# Patient Record
Sex: MALE | Race: WHITE | URBAN - METROPOLITAN AREA
[De-identification: names, ages, dates, MRNs, and addresses within clinical notes are randomized per-mention and may not be internally consistent; named-entity substitution may affect disease eponyms.]

---

## 2019-04-18 ENCOUNTER — TELEPHONE (OUTPATIENT)
Dept: PULMONOLOGY | Age: 49
End: 2019-04-18

## 2019-04-18 NOTE — TELEPHONE ENCOUNTER
NPT referral from Dr. Sam Pickett for ILD. Per darryl Carroll for N/A 45 min opening. Need a new PFT faxed, copy sent was not legible, very blurry.

## 2019-05-03 NOTE — TELEPHONE ENCOUNTER
Mailed NPT paperwork. Faxed request to Dr. Arroyo Crelul office to send PFT and labs on pt. Faxed request for imaging disc to be mailed.

## 2019-07-22 ENCOUNTER — TELEPHONE (OUTPATIENT)
Dept: PULMONOLOGY | Age: 49
End: 2019-07-22

## 2019-09-23 NOTE — TELEPHONE ENCOUNTER
Office has not rec'd PFT from [facility]. Faxed another request for result to be sent prior to pt appt with Dr. Mercy Mcnulty.

## 2019-09-26 ENCOUNTER — OFFICE VISIT (OUTPATIENT)
Dept: PULMONOLOGY | Age: 49
End: 2019-09-26
Payer: COMMERCIAL

## 2019-09-26 VITALS
SYSTOLIC BLOOD PRESSURE: 134 MMHG | BODY MASS INDEX: 39.17 KG/M2 | TEMPERATURE: 98.5 F | DIASTOLIC BLOOD PRESSURE: 68 MMHG | HEIGHT: 75 IN | RESPIRATION RATE: 16 BRPM | WEIGHT: 315 LBS | OXYGEN SATURATION: 96 % | HEART RATE: 93 BPM

## 2019-09-26 DIAGNOSIS — Z87.891 FORMER TOBACCO USE: ICD-10-CM

## 2019-09-26 DIAGNOSIS — J44.9 COPD, MODERATE (HCC): ICD-10-CM

## 2019-09-26 DIAGNOSIS — I27.20 PULMONARY HYPERTENSION (HCC): ICD-10-CM

## 2019-09-26 DIAGNOSIS — R93.89 ABNORMAL CHEST CT: ICD-10-CM

## 2019-09-26 PROCEDURE — 99205 OFFICE O/P NEW HI 60 MIN: CPT | Performed by: INTERNAL MEDICINE

## 2019-09-26 RX ORDER — SOTALOL HYDROCHLORIDE 80 MG/1
80 TABLET ORAL
COMMUNITY
Start: 2018-02-21

## 2019-09-26 RX ORDER — ERGOCALCIFEROL 1.25 MG/1
CAPSULE ORAL
Refills: 1 | COMMUNITY
Start: 2019-09-25

## 2019-09-26 RX ORDER — APIXABAN 5 MG/1
TABLET, FILM COATED ORAL
Refills: 3 | COMMUNITY
Start: 2019-09-25

## 2019-09-26 RX ORDER — OMEPRAZOLE 40 MG/1
40 CAPSULE, DELAYED RELEASE ORAL
COMMUNITY

## 2019-09-26 RX ORDER — FLUTICASONE PROPIONATE 50 MCG
1 SPRAY, SUSPENSION (ML) NASAL
COMMUNITY

## 2019-09-26 RX ORDER — BUPRENORPHINE HYDROCHLORIDE, NALOXONE HYDROCHLORIDE 8; 2 MG/1; MG/1
FILM, SOLUBLE BUCCAL; SUBLINGUAL
Refills: 0 | COMMUNITY
Start: 2019-09-25

## 2019-09-26 RX ORDER — FUROSEMIDE 20 MG/1
20 TABLET ORAL
COMMUNITY
Start: 2019-08-29

## 2019-09-26 RX ORDER — VERAPAMIL HYDROCHLORIDE 80 MG/1
80 TABLET ORAL
COMMUNITY
Start: 2017-04-28

## 2019-09-26 RX ORDER — LORATADINE 10 MG/1
TABLET ORAL
Refills: 1 | COMMUNITY
Start: 2019-09-25

## 2019-09-26 RX ORDER — ROPINIROLE 1 MG/1
1 TABLET, FILM COATED ORAL
COMMUNITY

## 2019-09-26 NOTE — PROGRESS NOTES
damage or mold/mildew in the home   G. Asbestos   H. Down pillows or comforters    I.  Pigeons, parakeets or other birds            REVIEW OF SYSTEMS:    CONSTITUTIONAL: Negative for fevers and chills  EYES: no conjunctival injection, no redness or drainage  ENT: Negative for oropharyngeal exudate, post nasal drip, sinus pain / pressure, nasal congestion, no oral ulcerations  RESPIRATORY:  No hemoptysis or pleuritic pain. CARDIOVASCULAR: Negative for chest pain, palpitations, PND  GASTROINTESTINAL: Negative for nausea, vomiting, diarrhea, constipation and abdominal pain  MUSCULOSKELETAL:  No arthralgias/arthritis or muscle weakness  HEMATOLOGICAL/LYMPH: Negative for adenopathy  SKIN:  No rash or nodules  EXTREMITIES: Negative for cyanosis or edema. NEUROLOGICAL: Negative for unilateral weakness, speech or gait abnormalities    Past Medical History:   Diagnosis Date    Hypertension     Lung disease     Sleep apnea        Past Surgical History:        Procedure Laterality Date    DIAGNOSTIC CARDIAC CATH LAB PROCEDURE         Allergies:  has No Known Allergies. Social History:    TOBACCO:   reports that he quit smoking about 3 years ago. His smoking use included cigarettes. He has a 40.00 pack-year smoking history. His smokeless tobacco use includes chew. ETOH:   has no alcohol history on file.   Patient currently lives independently      Family History:       Problem Relation Age of Onset    Diabetes Maternal Grandfather     Heart Failure Maternal Grandfather     Heart Failure Paternal Grandfather        Current Medications:    Current Outpatient Medications:     verapamil (CALAN) 80 MG tablet, Take 80 mg by mouth, Disp: , Rfl:     rOPINIRole (REQUIP) 1 MG tablet, Take 1 mg by mouth, Disp: , Rfl:     omeprazole (PRILOSEC) 40 MG delayed release capsule, Take 40 mg by mouth, Disp: , Rfl:     furosemide (LASIX) 20 MG tablet, Take 20 mg by mouth, Disp: , Rfl:     ELIQUIS 5 MG TABS tablet, , Disp: , Rfl:

## 2019-09-26 NOTE — ASSESSMENT & PLAN NOTE
I do not believe the patient has diffuse interstitial lung disease. Findings in the bases of the lungs most likely represent residual scar after possible musculoskeletal/rib injury several years ago.   The findings have not progressed over the last 3 years.  -I do not recommend lung biopsy

## 2019-09-26 NOTE — LETTER
[address] [place] [address] Pulmonary, Critical Care, and Sleep  [address] [place] [address] [address] [id number]  Phone: [phone]  Fax: [phone]    Clemencia Zarco    September 26, 2019     Patient: Esperanza Escamilla   MR Number: [id number]   YOB: 1970   Date of Visit: 9/26/2019     Dear Dr. Raven Camarillo: Thank you for the request for consultation for Esperanza Escamilla to me for the evaluation of abnormal chest CT. Below are the relevant portions of my assessment and plan of care. Abnormal chest CT  I do not believe the patient has diffuse interstitial lung disease. Findings in the bases of the lungs most likely represent residual scar after possible musculoskeletal/rib injury several years ago. The findings have not progressed over the last 3 years.  -I do not recommend lung biopsy    COPD, moderate ([address]  -Progressing by pulmonary function test over the last several years. I believe this is most likely primary pathology for his worsening shortness of breath.  -He needs to avoid smoking any substances including cigarettes or marijuana  -Continue inhaled bronchodilators. I gave him sample and will do a a trial of Anora. He did not feel Trelegy helped much. Pulmonary hypertension (HCC)  -Thought to be WHO class III  -Follow-up at pulmonary hypertension center at [facility]    Former tobacco use  -Advised continued cessation    If you have questions, please do not hesitate to call me. I look forward to following Trudy Becerril along with you.     Sincerely,        Debbi Gomez MD